# Patient Record
Sex: MALE | Race: WHITE | NOT HISPANIC OR LATINO | URBAN - METROPOLITAN AREA
[De-identification: names, ages, dates, MRNs, and addresses within clinical notes are randomized per-mention and may not be internally consistent; named-entity substitution may affect disease eponyms.]

---

## 2023-03-08 LAB — TESTOSTERONE (NG/DL) IN SER/PLAS: 212 NG/DL (ref 240–1000)

## 2023-04-06 LAB
ERYTHROCYTE DISTRIBUTION WIDTH (RATIO) BY AUTOMATED COUNT: 11.6 % (ref 11.5–14.5)
ERYTHROCYTE MEAN CORPUSCULAR HEMOGLOBIN CONCENTRATION (G/DL) BY AUTOMATED: 33.9 G/DL (ref 32–36)
ERYTHROCYTE MEAN CORPUSCULAR VOLUME (FL) BY AUTOMATED COUNT: 88 FL (ref 80–100)
ERYTHROCYTES (10*6/UL) IN BLOOD BY AUTOMATED COUNT: 5.54 X10E12/L (ref 4.5–5.9)
HEMATOCRIT (%) IN BLOOD BY AUTOMATED COUNT: 49 % (ref 41–52)
HEMOGLOBIN (G/DL) IN BLOOD: 16.6 G/DL (ref 13.5–17.5)
LEUKOCYTES (10*3/UL) IN BLOOD BY AUTOMATED COUNT: 9 X10E9/L (ref 4.4–11.3)
PLATELETS (10*3/UL) IN BLOOD AUTOMATED COUNT: 345 X10E9/L (ref 150–450)
PROSTATE SPECIFIC AG (NG/ML) IN SER/PLAS: 0.86 NG/ML (ref 0–4)

## 2023-04-07 LAB
ESTRADIOL (PG/ML) IN SER/PLAS: 26 PG/ML
TESTOSTERONE (NG/DL) IN SER/PLAS: 306 NG/DL (ref 240–1000)

## 2023-06-22 ENCOUNTER — HOSPITAL ENCOUNTER (OUTPATIENT)
Dept: DATA CONVERSION | Facility: HOSPITAL | Age: 53
End: 2023-06-22
Attending: INTERNAL MEDICINE | Admitting: INTERNAL MEDICINE

## 2023-06-22 DIAGNOSIS — Z12.11 ENCOUNTER FOR SCREENING FOR MALIGNANT NEOPLASM OF COLON: ICD-10-CM

## 2023-06-22 DIAGNOSIS — N52.9 MALE ERECTILE DYSFUNCTION, UNSPECIFIED: ICD-10-CM

## 2023-06-22 DIAGNOSIS — E66.9 OBESITY, UNSPECIFIED: ICD-10-CM

## 2023-06-22 DIAGNOSIS — D12.8 BENIGN NEOPLASM OF RECTUM: ICD-10-CM

## 2023-06-22 DIAGNOSIS — K57.30 DIVERTICULOSIS OF LARGE INTESTINE WITHOUT PERFORATION OR ABSCESS WITHOUT BLEEDING: ICD-10-CM

## 2023-07-08 LAB
COMPLETE PATHOLOGY REPORT: NORMAL
CONVERTED CLINICAL DIAGNOSIS-HISTORY: NORMAL
CONVERTED FINAL DIAGNOSIS: NORMAL
CONVERTED FINAL REPORT PDF LINK TO COPY AND PASTE: NORMAL
CONVERTED GROSS DESCRIPTION: NORMAL

## 2023-09-07 VITALS — BODY MASS INDEX: 36.75 KG/M2 | HEIGHT: 68 IN | WEIGHT: 242.51 LBS

## 2024-03-21 ENCOUNTER — TELEMEDICINE (OUTPATIENT)
Dept: UROLOGY | Facility: HOSPITAL | Age: 54
End: 2024-03-21
Payer: COMMERCIAL

## 2024-03-21 DIAGNOSIS — N52.8 OTHER MALE ERECTILE DYSFUNCTION: Primary | ICD-10-CM

## 2024-03-21 PROCEDURE — 3008F BODY MASS INDEX DOCD: CPT | Performed by: UROLOGY

## 2024-03-21 PROCEDURE — 99214 OFFICE O/P EST MOD 30 MIN: CPT | Performed by: UROLOGY

## 2024-03-21 RX ORDER — TADALAFIL 5 MG/1
5 TABLET ORAL DAILY
Qty: 90 TABLET | Refills: 3 | Status: SHIPPED | OUTPATIENT
Start: 2024-03-21 | End: 2025-03-21

## 2024-03-21 NOTE — PROGRESS NOTES
FUV    Virtual or Telephone Consent    An interactive audio and video telecommunication system which permits real time communications between the patient (at the originating site) and provider (at the distant site) was utilized to provide this telehealth service.   Verbal consent was requested and obtained from Ant Potter on this date, 03/21/24 for a telehealth visit.     Last seen - 4/14/23     HISTORY OF PRESENT ILLNESS:   Ant Potter is a 53 y.o. male who is being seen today for fuv.      Been on daily tadalafil.      Been doing well, got connect with a PCP.      PAST MEDICAL HISTORY:  No past medical history on file.    PAST SURGICAL HISTORY:  No past surgical history on file.     ALLERGIES:   Not on File     MEDICATIONS:   No current outpatient medications     PHYSICAL EXAM:  There were no vitals taken for this visit.  Constitutional: Patient appears well-developed and well-nourished. No distress.    Pulmonary/Chest: Effort normal. No respiratory distress.   Abdominal: Soft, ND NT  Musculoskeletal: Normal range of motion.    Neurological: Alert and oriented to person, place, and time.  Psychiatric: Normal mood and affect. Behavior is normal. Thought content normal.      Labs  Lab Results   Component Value Date    TESTOSTERONE 306 04/06/2023    TESTOSTERONE 212 (L) 03/08/2023     Lab Results   Component Value Date    ESTRADIOL 26 04/06/2023     Lab Results   Component Value Date    PSA 0.86 04/06/2023     Lab Results   Component Value Date    HCT 49.0 04/06/2023     Assessment:      1. Other male erectile dysfunction          Ant Potter is a 53 y.o. male here for FUV    Doing well     Plan:   1)  Refilled daily tadalafil    PDE-5 inhibitor  The patient has been diagnosed with erectile dysfunction and cardiac assessment according to the [place] criteria allows use of oral PDE-5 inhibitor.  The patient understands that these medications are not initiators of erection and that he will still require  sexual stimulation.  If prescribed vardenafil or sildenafil, he was advised to take the medication 30-60 minutes prior to sexual activity and that the efficacy of the medication is decreased following a high-fat meal.  The patient verbalized understanding that nitrates such as nitroglycerine, isosorbide mononitrate, isosorbide dinitrate and any other nitrate preparations are absolutely contraindicated with the use of a PDE-5 inhibitor. The patient was advised to alert any medical personal of PDE-5 inhibitor use should he seek urgent medical attention for any reason.  I explained the common adverse effects of therapy including, but not limited to headache, flushing, dizziness, rash, upset stomach, diarrhea, nasal congestion, abnormal vision, back pain, and myalgia.  Less common side effects are lightheadedness or blood pressure drop upon standing.   We discussed that patients have  after using medications in this class, and sometimes the exact cause of death was not able to be determined.  There is a very small risk of non-arteritic ischemic optic neuropathy, a rare cause of blindness that may be permanent while using medications in this class.  Patient is instructed to immediately stop this medication and seek medical attention if he develops visual disturbances in one or both eyes.  We discussed that if he experiences erection lasting longer than four hours, he needs to seek immediate treatment at the emergency department as the longer he waits, the more damage that is done to the penile tissue.    The patient states that he is not withholding any information about his condition or the use of medications in order to receive a PDE-5 inhibitor class medication.  No barriers to learning were identified.  After all of the patient's questions were satisfactorily answered, he expressed understanding of the risks of therapy and wishes to proceed.